# Patient Record
Sex: FEMALE | Race: BLACK OR AFRICAN AMERICAN | Employment: UNEMPLOYED | URBAN - METROPOLITAN AREA
[De-identification: names, ages, dates, MRNs, and addresses within clinical notes are randomized per-mention and may not be internally consistent; named-entity substitution may affect disease eponyms.]

---
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
## 2025-03-19 LAB
ABO, EXTERNAL RESULT: NORMAL
HEP B, EXTERNAL RESULT: NEGATIVE
HIV, EXTERNAL RESULT: NONREACTIVE
RH FACTOR, EXTERNAL RESULT: POSITIVE
RUBELLA TITER, EXTERNAL RESULT: NORMAL

## 2025-04-17 ENCOUNTER — APPOINTMENT (OUTPATIENT)
Facility: HOSPITAL | Age: 20
End: 2025-04-17
Payer: COMMERCIAL

## 2025-04-17 ENCOUNTER — HOSPITAL ENCOUNTER (EMERGENCY)
Facility: HOSPITAL | Age: 20
Discharge: HOME | End: 2025-04-17
Attending: STUDENT IN AN ORGANIZED HEALTH CARE EDUCATION/TRAINING PROGRAM
Payer: COMMERCIAL

## 2025-04-17 VITALS
OXYGEN SATURATION: 100 % | BODY MASS INDEX: 34.15 KG/M2 | SYSTOLIC BLOOD PRESSURE: 145 MMHG | WEIGHT: 200 LBS | TEMPERATURE: 98.4 F | RESPIRATION RATE: 20 BRPM | DIASTOLIC BLOOD PRESSURE: 83 MMHG | HEART RATE: 112 BPM | HEIGHT: 64 IN

## 2025-04-17 DIAGNOSIS — Z3A.17 17 WEEKS GESTATION OF PREGNANCY: Primary | ICD-10-CM
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
## 2025-04-17 PROCEDURE — 99284 EMERGENCY DEPT VISIT MOD MDM: CPT

## 2025-04-17 PROCEDURE — 76815 OB US LIMITED FETUS(S): CPT

## 2025-04-17 ASSESSMENT — PAIN SCALES - GENERAL
PAINLEVEL_OUTOF10: 4
PAINLEVEL_OUTOF10: 5

## 2025-04-17 ASSESSMENT — LIFESTYLE VARIABLES
HOW OFTEN DO YOU HAVE A DRINK CONTAINING ALCOHOL: NEVER
HOW MANY STANDARD DRINKS CONTAINING ALCOHOL DO YOU HAVE ON A TYPICAL DAY: PATIENT DOES NOT DRINK

## 2025-04-17 ASSESSMENT — PAIN DESCRIPTION - ORIENTATION
ORIENTATION: RIGHT
ORIENTATION: RIGHT

## 2025-04-17 ASSESSMENT — PAIN - FUNCTIONAL ASSESSMENT: PAIN_FUNCTIONAL_ASSESSMENT: 0-10

## 2025-04-17 ASSESSMENT — PAIN DESCRIPTION - LOCATION
LOCATION: ABDOMEN
LOCATION: ABDOMEN

## 2025-04-18 NOTE — ED TRIAGE NOTES
Pt arrives after falling tonight while running, pt states when she fell she landed on her right side, abrasion to right elbow, c/o right side pain.  Denies hitting head, no LOC.     Pt is 17 weeks pregnant.

## 2025-04-18 NOTE — DISCHARGE INSTRUCTIONS
Thank you for choosing our Emergency Department for your care.  It is our privilege to care for you in your time of need.  In the next several days, you may receive a survey via email or mailed to your home about your experience with our team.  We would greatly appreciate you taking a few minutes to complete the survey, as we use this information to learn what we have done well and what we could be doing better. Thank you for trusting us with your care!     Please review FORVM for a more detailed result list since the below list may not be comprehensive. Instructions on how to sign up to FORVM should be provided in this packet.    Below you will find a list of your tests from today's visit.     Labs and Radiology Studies  No results found for this or any previous visit (from the past 12 hours).  US OB 1 OR MORE FETUS LIMITED  Result Date: 4/17/2025  EXAM: US OB 1 OR MORE FETUS LIMITED ACC#: [id number]  INDICATION: trauma, fell,R sided pain  COMPARISON: None. TECHNIQUE: Sonographic obstetric evaluation. FINDINGS: There is a single live intrauterine pregnancy.  The fetus is in breech presentation.  Fetal heart rate is 160 bpm. The placenta is located in the fundal  region.  There is no placenta previa.  Amniotic fluid volume is qualitatively within normal limits. The cervix is long and closed.  It measures 3.2 cm long. The composite estimated gestational age is 17 weeks 1 days.  This is in agreement with the clinical age. Maternal right ovary: Not visualized due to bowel gas and gravid uterus. Maternal left ovary: Not visualized due to bowel gas and gravid uterus.     Single live intrauterine pregnancy with an estimated gestational age of 17 weeks and 1 day. No significant abnormalities. . Electronically signed by YOLA Matamoros      ------------------------------------------------------------------------------------------------------------  The exam and treatment you received in the Emergency Department were for an

## 2025-04-18 NOTE — ED PROVIDER NOTES
hemorrhage, trauma in pregnancy.  Will obtain ultrasound for further evaluation, fetal heart tones were found L&D exam.      Screenings:                         Clinical Management Tools:       ED Course     Patient was given the following medications:  Medications - No data to display    ED Course and Reassessments:  Ultrasound returned unremarkable, had nurses attempt to draw a ABO Rh x 3 however the patient declined further attempts after 3 times.  ED Course as of 04/18/25 0022   Thu Apr 17, 2025   2313 Patient is agitated talking on the phone, heart rate at 111, she is currently stable for discharge, she would like to leave the ED at this time, she is angry as she got multiple attempts at blood draw that were unsuccessful. And does not want to stay for reassessment of HR. Explained risks. She has full capacity to make medical decisions at this time and wants to leave.  I will discharge her [PC]      ED Course User Index  [PC] Juan Antonio Hsieh MD          Sepsis Reassessment: Patient does NOT meet Sepsis criteria after ED workup    Consults:  None     Smoking Cessation:    Results     Labs:  No results found for this or any previous visit (from the past 12 hours).    Radiologic Studies:  Non-plain film images such as CT, Ultrasound and MRI are read by the radiologist. Plain radiographic images are visualized and preliminarily interpreted by the ED Provider with the below findings:    Interpretation per the Radiologist below, if available at the time of this note:  US OB 1 OR MORE FETUS LIMITED   Final Result   Single live intrauterine pregnancy with an estimated gestational age of 17 weeks   and 1 day. No significant abnormalities. .      Electronically signed by YOLA Matamoros           Diagnosis     Clinical Impression:   1. 17 weeks gestation of pregnancy    2. Fall, initial encounter        Disposition & Disposition Considerations     DISPOSITION Decision To Discharge 04/17/2025 11:09:29 PM   DISPOSITION  CONDITION Stable           Discharge Note: The patient is stable for discharge. The signs, symptoms, diagnosis, and discharge instructions have been discussed, understanding conveyed between all parties, and agreed upon. Understanding was insured that at this time there is no evidence for a more malignant underlying process, but that early in the process of an illness, an emergency department workup can be falsely reassuring.  Routine discharge counseling was given including the fact that any worsening, changing, or persistent symptoms should prompt an immediate call or follow up with their primary physician or a return to the emergency department. The importance of appropriate follow up was also discussed as was the importance of review of all lab work and imaging conducted in the emergency department with an outside physician. More extensive discharge instructions were given in the patient's discharge paperwork. After completion of evaluation and discussion of results and diagnoses, all the questions were answered. If required, all follow up appointments and treatments were discussed and explained. Understanding was insured prior to discharge.    Social Determinants affecting Treatment Plan:         DISCHARGE PLAN:  1.   Discharge Medication List as of 4/17/2025 11:25 PM        CONTINUE these medications which have NOT CHANGED    Details   ondansetron (ZOFRAN-ODT) 4 MG disintegrating tablet Take 1 tablet by mouth every 8 hours as neededHistorical Med            2. Your Doctor    Schedule an appointment as soon as possible for a visit in 2 days  For re-evaluation and follow up    3.  Return to ED if worse    4.      Medication List        ASK your doctor about these medications      ondansetron 4 MG disintegrating tablet  Commonly known as: ZOFRAN-ODT            5. Discontinued Medications:   Discharge Medication List as of 4/17/2025 11:25 PM          Procedures     Unless otherwise noted below, none.    Performed

## 2025-04-18 NOTE — ED NOTES
Multiple IV attempts made by staff members to obtain blood work. Patient pointing to area on arm in which she approves of staff to stick. Unsuccessful. This RN attempted x2 without success, patient states \"I am done with you trying. You need to get out of my room. My fucking arm is bleeding.\" Patient heard from outside of room by this RN referring to staff as \"bitches\".     MD Jori consulted. US clear, given approval to hold off on blood work at this time.

## 2025-07-11 ENCOUNTER — HOSPITAL ENCOUNTER (OUTPATIENT)
Facility: HOSPITAL | Age: 20
Discharge: HOME | End: 2025-07-11
Attending: OBSTETRICS & GYNECOLOGY | Admitting: OBSTETRICS & GYNECOLOGY
Payer: COMMERCIAL

## 2025-07-11 VITALS
DIASTOLIC BLOOD PRESSURE: 72 MMHG | OXYGEN SATURATION: 100 % | TEMPERATURE: 98.3 F | HEART RATE: 92 BPM | HEIGHT: 64 IN | SYSTOLIC BLOOD PRESSURE: 114 MMHG | WEIGHT: 209 LBS | BODY MASS INDEX: 35.68 KG/M2 | RESPIRATION RATE: 20 BRPM

## 2025-07-11 PROBLEM — Z3A.29 29 WEEKS GESTATION OF PREGNANCY: Status: ACTIVE | Noted: 2025-07-11

## 2025-07-11 LAB
APPEARANCE UR: ABNORMAL
BACTERIA URNS QL MICRO: ABNORMAL /HPF
BILIRUB UR QL: NEGATIVE
COLOR UR: ABNORMAL
EPITH CASTS URNS QL MICRO: ABNORMAL /LPF
FIBRONECTIN FETAL VAG QL: NEGATIVE
GLUCOSE UR STRIP.AUTO-MCNC: NEGATIVE MG/DL
HGB UR QL STRIP: NEGATIVE
KETONES UR QL STRIP.AUTO: NEGATIVE MG/DL
LEUKOCYTE ESTERASE UR QL STRIP.AUTO: ABNORMAL
MUCOUS THREADS URNS QL MICRO: ABNORMAL /LPF
NITRITE UR QL STRIP.AUTO: NEGATIVE
PH UR STRIP: 6 (ref 5–8)
PROT UR STRIP-MCNC: NEGATIVE MG/DL
RBC #/AREA URNS HPF: ABNORMAL /HPF (ref 0–5)
SP GR UR REFRACTOMETRY: 1.01 (ref 1–1.03)
URINE CULTURE IF INDICATED: ABNORMAL
UROBILINOGEN UR QL STRIP.AUTO: 0.1 EU/DL (ref 0.1–1)
WBC URNS QL MICRO: ABNORMAL /HPF (ref 0–4)

## 2025-07-11 PROCEDURE — 4500000002 HC ER NO CHARGE

## 2025-07-11 PROCEDURE — 99215 OFFICE O/P EST HI 40 MIN: CPT

## 2025-07-11 PROCEDURE — 87086 URINE CULTURE/COLONY COUNT: CPT

## 2025-07-11 PROCEDURE — 82731 ASSAY OF FETAL FIBRONECTIN: CPT

## 2025-07-11 PROCEDURE — 81001 URINALYSIS AUTO W/SCOPE: CPT

## 2025-07-11 RX ORDER — METRONIDAZOLE 500 MG/1
500 TABLET ORAL 2 TIMES DAILY
COMMUNITY
Start: 2025-06-23

## 2025-07-11 RX ORDER — PRENATAL VIT NO.126/IRON/FOLIC 28MG-0.8MG
1 TABLET ORAL DAILY
COMMUNITY

## 2025-07-11 RX ORDER — ASPIRIN 81 MG/1
81 TABLET ORAL DAILY
COMMUNITY
Start: 2025-03-17

## 2025-07-11 NOTE — H&P
History & Physical    Name: Rachel Glover MRN: [id number]  SSN: [id number]    YOB: 2005  Age: 19 y.o.  Sex: female        Estimated Date of Delivery: None noted.  OB History    Para Term  AB Living   1        SAB IAB Ectopic Molar Multiple Live Births              # Outcome Date GA Lbr Jace/2nd Weight Sex Type Anes PTL Lv   1 Current                Chief Complaint   Patient presents with    Abdominal Pain     cramping     Patient is a 18yo  @ 29w1d with [place] 2025 presents to L&D with c/o lower abdominal cramping that started last night. She states that it is like menstrual cramps. She denies leakage of fluid, vaginal bleeding. There is good fetal movement. She states she was recently diagnosed with trichomonas and has not yet treated. Unsure of the full wet prep results. She denies any significant issues with this pregnancy and is compliant with her care. She will be transferring to a new practice with her first appointment next week on 2025.    Past Medical History:   Diagnosis Date    Anxiety      History reviewed. No pertinent surgical history.  Social History     Occupational History    Not on file   Tobacco Use    Smoking status: Never    Smokeless tobacco: Not on file   Substance and Sexual Activity    Alcohol use: Not Currently    Drug use: Never    Sexual activity: Not on file     History reviewed. No pertinent family history.    No Known Allergies  Prior to Admission medications    Medication Sig Start Date End Date Taking? Authorizing Provider   Prenatal Vit-Fe Fumarate-FA (CLASSIC PRENATAL) 28-0.8 MG TABS Take 1 tablet by mouth daily   Yes ProviderBryan MD   metroNIDAZOLE (FLAGYL) 500 MG tablet Take 1 tablet by mouth in the morning and at bedtime 25  Yes ProviderBryan MD   ondansetron (ZOFRAN-ODT) 4 MG disintegrating tablet Take 1 tablet by mouth every 8 hours as needed 23  Yes Automatic Reconciliation, Ar   aspirin 81 MG EC tablet Take 1  tablet by mouth daily  Patient not taking: Reported on 7/11/2025 3/17/25   Provider, Bryan, MD        Review of Systems    Objective:     Vitals:  Vitals:    07/11/25 1015 07/11/25 1030 07/11/25 1045 07/11/25 1100   BP: 119/79 117/80 105/64 97/65   Pulse:  86 100 (!) 102   Resp:       Temp:       TempSrc:       SpO2:  100% 97% 98%   Weight:       Height:          CVS: RRR  Lungs: nl respiratory effort  Abd: soft, gravid, non-tender      OET327x, no decels, positive acels, moderate LTV, Category 1  [place]: occasional with irritability  SVE: closed/thick/high (-%) remained unchanged after 2.5 hours.    USS: active fetus in cephalic presentation, adequate amniotic fluid, normal fundal placenta  Prenatal Labs:    No results found for: \"ABORH\", \"RUBELLAEXT\", \"GRBSEXT\", \"HBSAGEXT\", \"HIVEXT\", \"RPREXT\", \"GONNOEXT\"     Results for orders placed or performed during the hospital encounter of 07/11/25   Urinalysis with Reflex to Culture    Specimen: Urine   Result Value Ref Range    Color, UA Yellow/Straw      Appearance Turbid (A) Clear      Specific Gravity, UA 1.015 1.003 - 1.030      pH, Urine 6.0 5.0 - 8.0      Protein, UA Negative Negative mg/dL    Glucose, Ur Negative Negative mg/dL    Ketones, Urine Negative Negative mg/dL    Bilirubin, Urine Negative Negative      Blood, Urine Negative Negative      Urobilinogen, Urine 0.1 0.1 - 1.0 EU/dL    Nitrite, Urine Negative Negative      Leukocyte Esterase, Urine Large (A) Negative      WBC, UA 20-50 0 - 4 /hpf    RBC, UA 5-10 0 - 5 /hpf    Epithelial Cells, UA Many (A) Few /lpf    BACTERIA, URINE 1+ (A) Negative /hpf    Urine Culture if Indicated Urine Culture Ordered (A) Culture not indicated by UA result      Mucus, UA Trace (A) Negative /lpf     FFN pending  Impression/Plan:     Uterine contractions  29 weeks gestation     Plan:   Discharge with PTL precautions  Follow up appointment 7/18. Informed patient to notify provider to review urine culture results.  Maternal &

## 2025-07-11 NOTE — PROGRESS NOTES
0845- Pt arrived to unit from ER for complaints of abdominal cramping, abdominal tightness, and back pain that began early yesterday evening. Pt states pain last night was about an 8, and now rating it a 6. Pt denies any vaginal bleeding or leaking of fluid. Pt reports having vaginal discharge. Primary ob sent prescription for flagyl a few weeks ago, but pt states she has been having issues picking it up. States she picked it up yesterday, but hasn't taken it yet. Pt feeling baby move as much as usual.     0938- Md performed bedside ultrasound, collected ffn, and performed sve, closed/firm/posterior.    1219- MD performed SVE, cervix unchanged. Pt rating pain a 2 now. Discharge orders received.    1238- EFM discontinued. Discharge instructions reviewed w/ pt, pt denies having any questions at this time.

## 2025-07-12 LAB
BACTERIA SPEC CULT: NORMAL
COLONY COUNT, CNT: NORMAL
COLONY COUNT, CNT: NORMAL
Lab: NORMAL

## 2025-07-18 ENCOUNTER — INITIAL PRENATAL (OUTPATIENT)
Age: 20
End: 2025-07-18

## 2025-07-18 VITALS
DIASTOLIC BLOOD PRESSURE: 74 MMHG | WEIGHT: 218.44 LBS | BODY MASS INDEX: 37.29 KG/M2 | SYSTOLIC BLOOD PRESSURE: 118 MMHG | HEIGHT: 64 IN

## 2025-07-18 DIAGNOSIS — Z23 NEED FOR TDAP VACCINATION: ICD-10-CM

## 2025-07-18 DIAGNOSIS — Z34.83 PRENATAL CARE, SUBSEQUENT PREGNANCY, THIRD TRIMESTER: Primary | ICD-10-CM

## 2025-07-18 DIAGNOSIS — Z3A.30 30 WEEKS GESTATION OF PREGNANCY: ICD-10-CM

## 2025-07-18 DIAGNOSIS — A59.9 TRICHOMONIASIS: ICD-10-CM

## 2025-07-18 PROBLEM — Z3A.29 29 WEEKS GESTATION OF PREGNANCY: Status: RESOLVED | Noted: 2025-07-11 | Resolved: 2025-07-18

## 2025-07-18 PROCEDURE — 0502F SUBSEQUENT PRENATAL CARE: CPT | Performed by: OBSTETRICS & GYNECOLOGY

## 2025-07-18 NOTE — PROGRESS NOTES
19 y.o.   at 30w1d       Current Outpatient Medications:     Prenatal Vit-Fe Fumarate-FA (CLASSIC PRENATAL) 28-0.8 MG TABS, Take 1 tablet by mouth daily, Disp: , Rfl:     metroNIDAZOLE (FLAGYL) 500 MG tablet, Take 1 tablet by mouth in the morning and at bedtime, Disp: , Rfl:     aspirin 81 MG EC tablet, Take 1 tablet by mouth daily (Patient not taking: Reported on 2025), Disp: , Rfl:     ondansetron (ZOFRAN-ODT) 4 MG disintegrating tablet, Take 1 tablet by mouth every 8 hours as needed, Disp: , Rfl:     Complains of: Has no complaints today.  Transferred care from [facility].    1. Prenatal care, subsequent pregnancy, third trimester  Transferred care from [facility].  Patient reports having routine laboratory workup done, not available at this time.  Will request patient's chart.    2. 30-1/7 weeks gestation of pregnancy    - US OB 14 PLUS WEEKS SINGLE OR FIRST GESTATION  - Culture, Urine  - CBC with Auto Differential  - Hepatitis B Surface Antigen  - HIV 1/2 Ag/Ab, 4TH Generation,W Rflx Confirm  - Glucose Tolerance Gestational, 1 Hour  - T Pallidum Screen W/Reflex    3. Need for Tdap vaccination  Patient counseled on need for Tdap vaccination. Reviewed recommendation for Tdap to provide protection to unborn fetus against pertussis. Patient expressed understanding.     4. Trichomoniasis  Patient reports is actually on treatment.        Return in about 3 weeks (around 2025).     Levi Lomas M.D.  [facility]  Obstetrics and Gynecology  [phone]

## 2025-07-19 LAB
BACTERIA UR CULT: NO GROWTH
BASOPHILS # BLD AUTO: 0.1 X10E3/UL (ref 0–0.2)
BASOPHILS NFR BLD AUTO: 0 %
EOSINOPHIL # BLD AUTO: 0.2 X10E3/UL (ref 0–0.4)
EOSINOPHIL NFR BLD AUTO: 2 %
ERYTHROCYTE [DISTWIDTH] IN BLOOD BY AUTOMATED COUNT: 12.9 % (ref 11.7–15.4)
GLUCOSE 1H P 50 G GLC PO SERPL-MCNC: 104 MG/DL (ref 70–139)
HBV SURFACE AG SERPL QL IA: NEGATIVE
HCT VFR BLD AUTO: 32.9 % (ref 34–46.6)
HGB BLD-MCNC: 10.7 G/DL (ref 11.1–15.9)
HIV 1+2 AB+HIV1 P24 AG SERPL QL IA: NON REACTIVE
IMM GRANULOCYTES # BLD AUTO: 0.6 X10E3/UL (ref 0–0.1)
IMM GRANULOCYTES NFR BLD AUTO: 4 %
LYMPHOCYTES # BLD AUTO: 2.6 X10E3/UL (ref 0.7–3.1)
LYMPHOCYTES NFR BLD AUTO: 17 %
MCH RBC QN AUTO: 30.8 PG (ref 26.6–33)
MCHC RBC AUTO-ENTMCNC: 32.5 G/DL (ref 31.5–35.7)
MCV RBC AUTO: 95 FL (ref 79–97)
MONOCYTES # BLD AUTO: 1 X10E3/UL (ref 0.1–0.9)
MONOCYTES NFR BLD AUTO: 7 %
NEUTROPHILS # BLD AUTO: 11.1 X10E3/UL (ref 1.4–7)
NEUTROPHILS NFR BLD AUTO: 70 %
PLATELET # BLD AUTO: 363 X10E3/UL (ref 150–450)
RBC # BLD AUTO: 3.47 X10E6/UL (ref 3.77–5.28)
WBC # BLD AUTO: 15.6 X10E3/UL (ref 3.4–10.8)

## 2025-07-21 DIAGNOSIS — O99.019 ANEMIA DURING PREGNANCY: Primary | ICD-10-CM

## 2025-07-21 LAB
INTERPRETATION: NORMAL
TREPONEMA PALLIDUM IGG+IGM AB [PRESENCE] IN SERUM OR PLASMA BY IMMUNOASSAY: NON REACTIVE

## 2025-07-21 RX ORDER — FERROUS SULFATE 325(65) MG
325 TABLET ORAL 2 TIMES DAILY
Qty: 120 TABLET | Refills: 5 | Status: SHIPPED | OUTPATIENT
Start: 2025-07-21

## 2025-07-26 SDOH — ECONOMIC STABILITY: FOOD INSECURITY: WITHIN THE PAST 12 MONTHS, YOU WORRIED THAT YOUR FOOD WOULD RUN OUT BEFORE YOU GOT MONEY TO BUY MORE.: SOMETIMES TRUE

## 2025-07-26 SDOH — ECONOMIC STABILITY: INCOME INSECURITY: IN THE LAST 12 MONTHS, WAS THERE A TIME WHEN YOU WERE NOT ABLE TO PAY THE MORTGAGE OR RENT ON TIME?: YES

## 2025-07-26 SDOH — ECONOMIC STABILITY: FOOD INSECURITY: WITHIN THE PAST 12 MONTHS, THE FOOD YOU BOUGHT JUST DIDN'T LAST AND YOU DIDN'T HAVE MONEY TO GET MORE.: SOMETIMES TRUE

## 2025-07-26 SDOH — ECONOMIC STABILITY: TRANSPORTATION INSECURITY
IN THE PAST 12 MONTHS, HAS LACK OF TRANSPORTATION KEPT YOU FROM MEETINGS, WORK, OR FROM GETTING THINGS NEEDED FOR DAILY LIVING?: NO

## 2025-07-26 SDOH — ECONOMIC STABILITY: TRANSPORTATION INSECURITY
IN THE PAST 12 MONTHS, HAS THE LACK OF TRANSPORTATION KEPT YOU FROM MEDICAL APPOINTMENTS OR FROM GETTING MEDICATIONS?: NO

## 2025-07-28 ENCOUNTER — ROUTINE PRENATAL (OUTPATIENT)
Age: 20
End: 2025-07-28

## 2025-07-28 VITALS
HEIGHT: 65 IN | SYSTOLIC BLOOD PRESSURE: 113 MMHG | WEIGHT: 218.13 LBS | BODY MASS INDEX: 36.34 KG/M2 | DIASTOLIC BLOOD PRESSURE: 77 MMHG

## 2025-07-28 DIAGNOSIS — A59.01 TRICHOMONAL VAGINITIS DURING PREGNANCY IN THIRD TRIMESTER: ICD-10-CM

## 2025-07-28 DIAGNOSIS — B37.31 CANDIDAL VULVOVAGINITIS: ICD-10-CM

## 2025-07-28 DIAGNOSIS — O23.593 TRICHOMONAL VAGINITIS DURING PREGNANCY IN THIRD TRIMESTER: ICD-10-CM

## 2025-07-28 DIAGNOSIS — Z34.83 PRENATAL CARE, SUBSEQUENT PREGNANCY, THIRD TRIMESTER: Primary | ICD-10-CM

## 2025-07-28 DIAGNOSIS — Z3A.31 31 WEEKS GESTATION OF PREGNANCY: ICD-10-CM

## 2025-07-28 PROCEDURE — 0502F SUBSEQUENT PRENATAL CARE: CPT | Performed by: OBSTETRICS & GYNECOLOGY

## 2025-07-28 NOTE — PROGRESS NOTES
19 y.o.   at 31w4d       Current Outpatient Medications:     clotrimazole (LOTRIMIN) 2 % CREA vaginal cream, Place 1 applicator vaginally daily for 3 days, Disp: 21 g, Rfl: 0    ferrous sulfate (IRON 325) 325 (65 Fe) MG tablet, Take 1 tablet by mouth 2 times daily, Disp: 120 tablet, Rfl: 5    aspirin 81 MG EC tablet, Take 1 tablet by mouth daily (Patient not taking: Reported on 2025), Disp: , Rfl:     Prenatal Vit-Fe Fumarate-FA (CLASSIC PRENATAL) 28-0.8 MG TABS, Take 1 tablet by mouth daily, Disp: , Rfl:     metroNIDAZOLE (FLAGYL) 500 MG tablet, Take 1 tablet by mouth in the morning and at bedtime, Disp: , Rfl:     ondansetron (ZOFRAN-ODT) 4 MG disintegrating tablet, Take 1 tablet by mouth every 8 hours as needed, Disp: , Rfl:     Complains of: Vaginal discharge with vulvar itching.    1. Prenatal care, subsequent pregnancy, third trimester      2. 31-4/7 weeks gestation of pregnancy      3. Trichomonal vaginitis during pregnancy in third trimester  Follow-up culture taken today.  - Nuswab Vaginitis Plus (VG+)    4. Candidal vulvovaginitis  Whitish, thick discharge noted compatible with Candida.  - clotrimazole (LOTRIMIN) 2 % CREA vaginal cream; Place 1 applicator vaginally daily for 3 days  Dispense: 21 g; Refill: 0        Return in about 3 weeks (around 2025).     Levi Lomas M.D.  [facility]  Obstetrics and Gynecology  [phone]

## 2025-07-30 LAB
A VAGINAE DNA VAG QL NAA+PROBE: ABNORMAL SCORE
BVAB2 DNA VAG QL NAA+PROBE: ABNORMAL SCORE
C ALBICANS DNA VAG QL NAA+PROBE: POSITIVE
C GLABRATA DNA VAG QL NAA+PROBE: NEGATIVE
C TRACH DNA SPEC QL NAA+PROBE: NEGATIVE
MEGA1 DNA VAG QL NAA+PROBE: ABNORMAL SCORE
N GONORRHOEA DNA VAG QL NAA+PROBE: NEGATIVE
T VAGINALIS DNA VAG QL NAA+PROBE: NEGATIVE

## 2025-07-31 PROBLEM — O23.599: Status: ACTIVE | Noted: 2025-07-31

## 2025-07-31 PROBLEM — B37.31: Status: ACTIVE | Noted: 2025-07-31

## 2025-07-31 PROBLEM — Z86.19 HISTORY OF TRICHOMONIASIS: Status: ACTIVE | Noted: 2025-07-31

## 2025-08-01 ENCOUNTER — ROUTINE PRENATAL (OUTPATIENT)
Age: 20
End: 2025-08-01

## 2025-08-01 VITALS
DIASTOLIC BLOOD PRESSURE: 79 MMHG | WEIGHT: 221.19 LBS | HEIGHT: 65 IN | BODY MASS INDEX: 36.85 KG/M2 | SYSTOLIC BLOOD PRESSURE: 114 MMHG

## 2025-08-01 DIAGNOSIS — Z34.83 PRENATAL CARE, SUBSEQUENT PREGNANCY, THIRD TRIMESTER: Primary | ICD-10-CM

## 2025-08-01 DIAGNOSIS — O47.9 BRAXTON HICK'S CONTRACTION: ICD-10-CM

## 2025-08-01 DIAGNOSIS — Z3A.32 32 WEEKS GESTATION OF PREGNANCY: ICD-10-CM

## 2025-08-01 NOTE — PROGRESS NOTES
19 y.o.   at 32w1d, BTG: A Positive       Current Outpatient Medications:     ferrous sulfate (IRON 325) 325 (65 Fe) MG tablet, Take 1 tablet by mouth 2 times daily, Disp: 120 tablet, Rfl: 5    aspirin 81 MG EC tablet, Take 1 tablet by mouth daily (Patient not taking: Reported on 2025), Disp: , Rfl:     Prenatal Vit-Fe Fumarate-FA (CLASSIC PRENATAL) 28-0.8 MG TABS, Take 1 tablet by mouth daily, Disp: , Rfl:     metroNIDAZOLE (FLAGYL) 500 MG tablet, Take 1 tablet by mouth in the morning and at bedtime, Disp: , Rfl:     ondansetron (ZOFRAN-ODT) 4 MG disintegrating tablet, Take 1 tablet by mouth every 8 hours as needed, Disp: , Rfl:     Complains of: Pelvic pressure/contractions    1. Prenatal care, subsequent pregnancy, third trimester  Pelvic exam: Cervix: Closed    2. 32-1/7 weeks gestation of pregnancy  Growth ultrasound from today shows adequate interval growth.    3. [place] Carlosk's contraction  Reassured after pelvic exam        Return in about 3 weeks (around 2025) for JOSE J Lomas M.D.  [facility]  Obstetrics and Gynecology  [phone]

## 2025-08-17 ENCOUNTER — HOSPITAL ENCOUNTER (OUTPATIENT)
Facility: HOSPITAL | Age: 20
Discharge: HOME | End: 2025-08-18
Attending: OBSTETRICS & GYNECOLOGY | Admitting: OBSTETRICS & GYNECOLOGY
Payer: COMMERCIAL

## 2025-08-17 PROBLEM — Z3A.34 34 WEEKS GESTATION OF PREGNANCY: Status: ACTIVE | Noted: 2025-08-17

## 2025-08-17 LAB
APPEARANCE UR: ABNORMAL
BACTERIA URNS QL MICRO: NEGATIVE /HPF
BILIRUB UR QL: NEGATIVE
CAOX CRY URNS QL MICRO: ABNORMAL
COLOR UR: YELLOW
EPITH CASTS URNS QL MICRO: ABNORMAL /LPF
GLUCOSE UR STRIP.AUTO-MCNC: NEGATIVE MG/DL
HGB UR QL STRIP: NEGATIVE
KETONES UR QL STRIP.AUTO: NEGATIVE MG/DL
LEUKOCYTE ESTERASE UR QL STRIP.AUTO: ABNORMAL
MUCOUS THREADS URNS QL MICRO: ABNORMAL /LPF
NITRITE UR QL STRIP.AUTO: NEGATIVE
PH UR STRIP: 6 (ref 5–8)
PROT UR STRIP-MCNC: NEGATIVE MG/DL
RBC #/AREA URNS HPF: ABNORMAL /HPF (ref 0–5)
SP GR UR REFRACTOMETRY: 1.02 (ref 1–1.03)
UROBILINOGEN UR QL STRIP.AUTO: 0.1 EU/DL (ref 0.1–1)
WBC URNS QL MICRO: ABNORMAL /HPF (ref 0–4)

## 2025-08-17 PROCEDURE — 4500000002 HC ER NO CHARGE

## 2025-08-17 PROCEDURE — 81001 URINALYSIS AUTO W/SCOPE: CPT

## 2025-08-17 PROCEDURE — 2580000003 HC RX 258: Performed by: OBSTETRICS & GYNECOLOGY

## 2025-08-17 RX ORDER — SODIUM CHLORIDE, SODIUM LACTATE, POTASSIUM CHLORIDE, CALCIUM CHLORIDE 600; 310; 30; 20 MG/100ML; MG/100ML; MG/100ML; MG/100ML
INJECTION, SOLUTION INTRAVENOUS ONCE
Status: COMPLETED | OUTPATIENT
Start: 2025-08-17 | End: 2025-08-17

## 2025-08-17 RX ORDER — SODIUM CHLORIDE, SODIUM LACTATE, POTASSIUM CHLORIDE, CALCIUM CHLORIDE 600; 310; 30; 20 MG/100ML; MG/100ML; MG/100ML; MG/100ML
INJECTION, SOLUTION INTRAVENOUS ONCE
Status: COMPLETED | OUTPATIENT
Start: 2025-08-17 | End: 2025-08-18

## 2025-08-17 RX ADMIN — SODIUM CHLORIDE, SODIUM LACTATE, POTASSIUM CHLORIDE, AND CALCIUM CHLORIDE: .6; .31; .03; .02 INJECTION, SOLUTION INTRAVENOUS at 23:19

## 2025-08-17 RX ADMIN — SODIUM CHLORIDE, POTASSIUM CHLORIDE, SODIUM LACTATE AND CALCIUM CHLORIDE: 600; 310; 30; 20 INJECTION, SOLUTION INTRAVENOUS at 22:16

## 2025-08-18 VITALS
HEART RATE: 88 BPM | SYSTOLIC BLOOD PRESSURE: 129 MMHG | WEIGHT: 221.19 LBS | OXYGEN SATURATION: 97 % | RESPIRATION RATE: 18 BRPM | BODY MASS INDEX: 36.85 KG/M2 | TEMPERATURE: 98.3 F | DIASTOLIC BLOOD PRESSURE: 79 MMHG | HEIGHT: 65 IN

## 2025-08-18 PROCEDURE — 96361 HYDRATE IV INFUSION ADD-ON: CPT

## 2025-08-18 PROCEDURE — 96360 HYDRATION IV INFUSION INIT: CPT

## 2025-08-18 PROCEDURE — 99213 OFFICE O/P EST LOW 20 MIN: CPT

## 2025-08-25 ENCOUNTER — ROUTINE PRENATAL (OUTPATIENT)
Age: 20
End: 2025-08-25

## 2025-08-25 VITALS
DIASTOLIC BLOOD PRESSURE: 77 MMHG | WEIGHT: 223.56 LBS | HEIGHT: 65 IN | BODY MASS INDEX: 37.25 KG/M2 | SYSTOLIC BLOOD PRESSURE: 117 MMHG

## 2025-08-25 DIAGNOSIS — Z34.03 ENCOUNTER FOR SUPERVISION OF NORMAL FIRST PREGNANCY IN THIRD TRIMESTER: Primary | ICD-10-CM

## 2025-08-25 DIAGNOSIS — Z11.3 SCREENING EXAMINATION FOR STD (SEXUALLY TRANSMITTED DISEASE): ICD-10-CM

## 2025-08-25 DIAGNOSIS — Z3A.35 35 WEEKS GESTATION OF PREGNANCY: ICD-10-CM

## 2025-08-25 PROBLEM — Z3A.34 34 WEEKS GESTATION OF PREGNANCY: Status: RESOLVED | Noted: 2025-08-17 | Resolved: 2025-08-25

## 2025-08-25 PROCEDURE — 0502F SUBSEQUENT PRENATAL CARE: CPT | Performed by: OBSTETRICS & GYNECOLOGY

## 2025-08-27 LAB
C TRACH RRNA SPEC QL NAA+PROBE: NEGATIVE
GP B STREP DNA SPEC QL NAA+PROBE: NEGATIVE
N GONORRHOEA RRNA SPEC QL NAA+PROBE: NEGATIVE
T VAGINALIS RRNA SPEC QL NAA+PROBE: NEGATIVE

## 2025-09-02 ENCOUNTER — HOSPITAL ENCOUNTER (OUTPATIENT)
Facility: HOSPITAL | Age: 20
Discharge: HOME | End: 2025-09-02
Attending: OBSTETRICS & GYNECOLOGY | Admitting: OBSTETRICS & GYNECOLOGY
Payer: COMMERCIAL

## 2025-09-02 VITALS
WEIGHT: 223.56 LBS | HEIGHT: 65 IN | OXYGEN SATURATION: 100 % | SYSTOLIC BLOOD PRESSURE: 124 MMHG | DIASTOLIC BLOOD PRESSURE: 77 MMHG | RESPIRATION RATE: 18 BRPM | TEMPERATURE: 98.6 F | HEART RATE: 109 BPM | BODY MASS INDEX: 37.25 KG/M2

## 2025-09-02 PROBLEM — Z3A.36 36 WEEKS GESTATION OF PREGNANCY: Status: ACTIVE | Noted: 2025-09-02

## 2025-09-02 PROCEDURE — 99213 OFFICE O/P EST LOW 20 MIN: CPT

## 2025-09-02 PROCEDURE — 99212 OFFICE O/P EST SF 10 MIN: CPT

## 2025-09-02 PROCEDURE — 4500000002 HC ER NO CHARGE

## 2025-09-03 ENCOUNTER — ROUTINE PRENATAL (OUTPATIENT)
Age: 20
End: 2025-09-03

## 2025-09-03 VITALS
BODY MASS INDEX: 37.19 KG/M2 | DIASTOLIC BLOOD PRESSURE: 79 MMHG | WEIGHT: 223.25 LBS | SYSTOLIC BLOOD PRESSURE: 132 MMHG | HEIGHT: 65 IN

## 2025-09-03 DIAGNOSIS — Z34.83 PRENATAL CARE, SUBSEQUENT PREGNANCY, THIRD TRIMESTER: Primary | ICD-10-CM

## 2025-09-03 DIAGNOSIS — Z3A.36 36 WEEKS GESTATION OF PREGNANCY: ICD-10-CM

## 2025-09-03 PROCEDURE — 0502F SUBSEQUENT PRENATAL CARE: CPT | Performed by: OBSTETRICS & GYNECOLOGY

## 2025-09-05 ENCOUNTER — TELEPHONE (OUTPATIENT)
Age: 20
End: 2025-09-05